# Patient Record
Sex: FEMALE | Race: WHITE | NOT HISPANIC OR LATINO | URBAN - METROPOLITAN AREA
[De-identification: names, ages, dates, MRNs, and addresses within clinical notes are randomized per-mention and may not be internally consistent; named-entity substitution may affect disease eponyms.]

---

## 2018-05-24 ENCOUNTER — APPOINTMENT (RX ONLY)
Dept: URBAN - METROPOLITAN AREA CLINIC 23 | Facility: CLINIC | Age: 48
Setting detail: DERMATOLOGY
End: 2018-05-24

## 2018-05-24 ENCOUNTER — RX ONLY (OUTPATIENT)
Age: 48
Setting detail: RX ONLY
End: 2018-05-24

## 2018-05-24 DIAGNOSIS — Z41.9 ENCOUNTER FOR PROCEDURE FOR PURPOSES OTHER THAN REMEDYING HEALTH STATE, UNSPECIFIED: ICD-10-CM

## 2018-05-24 PROCEDURE — ? PRESCRIPTION

## 2018-05-24 PROCEDURE — ? BOTOX

## 2018-05-24 PROCEDURE — ? ADDITIONAL NOTES

## 2018-05-24 RX ORDER — TRIAMCINOLONE ACETONIDE 1 MG/G
CREAM TOPICAL
Qty: 1 | Refills: 0 | Status: ERX

## 2018-05-24 RX ORDER — VALACYCLOVIR HYDROCHLORIDE 500 MG/1
TABLET, FILM COATED ORAL
Qty: 20 | Refills: 0 | Status: ERX | COMMUNITY
Start: 2018-05-24

## 2018-05-24 RX ORDER — CEPHALEXIN 500 MG/1
CAPSULE ORAL
Qty: 20 | Refills: 0 | Status: ERX

## 2018-05-24 RX ADMIN — VALACYCLOVIR HYDROCHLORIDE: 500 TABLET, FILM COATED ORAL at 00:00

## 2018-05-24 NOTE — PROCEDURE: BOTOX
Lateral Platysmal Bands Units: 0
Additional Area 1 Units: [address]
Detail Level: Zone
Consent: Written consent obtained. Risks include but not limited to lid/brow ptosis, bruising, swelling, diplopia, temporary effect, incomplete chemical denervation.
Additional Area 3 Location: lateral eyes
Additional Area 4 Units: 4
Additional Area 2 Location: glabella
Additional Area 3 Units: [address] [facility]
Additional Area 2 Units: 20
Dilution (U/0.1 Cc): 2.5
Lot #: [id number]
Expiration Date (Month Year): 12/2020
Additional Area 1 Location: high forehead 2.5 cm above brows
Additional Area 4 Location: lateral brows
Price (Use Numbers Only, No Special Characters Or $): 0.00
Additional Area 5 Location: chin

## 2018-06-11 ENCOUNTER — APPOINTMENT (RX ONLY)
Dept: URBAN - METROPOLITAN AREA CLINIC 23 | Facility: CLINIC | Age: 48
Setting detail: DERMATOLOGY
End: 2018-06-11

## 2018-06-11 DIAGNOSIS — Z41.9 ENCOUNTER FOR PROCEDURE FOR PURPOSES OTHER THAN REMEDYING HEALTH STATE, UNSPECIFIED: ICD-10-CM

## 2018-06-11 PROCEDURE — ? ADDITIONAL NOTES

## 2018-06-11 PROCEDURE — ? IN-HOUSE DISPENSING PHARMACY

## 2018-06-11 ASSESSMENT — LOCATION ZONE DERM: LOCATION ZONE: EYELID

## 2018-06-11 ASSESSMENT — LOCATION SIMPLE DESCRIPTION DERM
LOCATION SIMPLE: RIGHT SUPERIOR EYELID
LOCATION SIMPLE: RIGHT INFERIOR EYELID
LOCATION SIMPLE: LEFT SUPERIOR EYELID
LOCATION SIMPLE: LEFT INFERIOR EYELID

## 2018-06-11 ASSESSMENT — LOCATION DETAILED DESCRIPTION DERM
LOCATION DETAILED: LEFT LATERAL SUPERIOR EYELID
LOCATION DETAILED: RIGHT LATERAL SUPERIOR EYELID
LOCATION DETAILED: RIGHT LATERAL INFERIOR EYELID
LOCATION DETAILED: LEFT MEDIAL INFERIOR PRESEPTAL REGION

## 2018-06-11 NOTE — PROCEDURE: ADDITIONAL NOTES
Additional Notes: Written consent obtained, risks reviewed including but not limited to crusting, scabbing, blistering, scarring, darker\\nor lighter pigmentary change, incomplete improvement of dyschromia, wrinkles, prolonged erythema and facial\\nswelling, infection and bleeding. \\n\\nCO2RE Laser visit with the following settings:\\n\\nMode: \\nFractionated coverage:\\nRing:\\nRing Size:\\nRing mj:\\nRing Fluence:\\nCore mg: \\nCore Fluence:\\n\\nMode: \\nFractionated coverage:\\nRing:\\nRing Size:\\nRing mj:\\nRing Fluence:\\nCore mg: \\nCore Fluence:\\n\\nMode: \\nFractionated coverage:\\nRing:\\nRing Size:\\nRing mj:\\nRing Fluence:\\nCore mg: \\nCore Fluence:\\n\\n\\n\\nI reviewed with the patient in detail post-care instructions. Patient should avoid sun until area fully healed. Pt should apply\\nvaseline to treated areas, and remove crusts gently with water-vinegar soaks.

## 2018-06-11 NOTE — PROCEDURE: IN-HOUSE DISPENSING PHARMACY
Product 20 Refills: 0
Product 6 Application Directions: Take one pill in AM and PM x3 days
Product 40 Unit Type: mg
Product 1 Amount/Unit (Numbers Only): 1
Product 4 Unit Type: tube(s)
Product 5 Unit Type: tablets
Send Charges To Patient Encounter: No
Name Of Product 2: brightening pads 4%
Name Of Product 3: Glycolic cleanser 10 %
Product 2 Unit Type: jar(s)
Detail Level: Simple
Product 1 Price/Unit (In Dollars): 0.00
Product 1 Application Directions: Apply thin layer to affected area at bedtime
Name Of Product 4: Tretinoin 0.05%
Name Of Product 1: Hydroquinone 4% / Tretinoin 0.05% / Fluocinolone 0.01%
Product 2 Application Directions: apply to face daily am and pm as indicated
Product 3 Application Directions: Wash face, arms and back daily as instructed
Name Of Product 6: Valtrex
Product 4 Application Directions: Apply pea sized amount to entire face at bedtime
Name Of Product 5: Prednisone 20 mg
Product 6 Amount/Unit (Numbers Only): 6
Product 5 Application Directions: Take one tablet 20 mg prior to procedure for swelling as indicated

## 2018-06-12 ENCOUNTER — APPOINTMENT (RX ONLY)
Dept: URBAN - METROPOLITAN AREA CLINIC 23 | Facility: CLINIC | Age: 48
Setting detail: DERMATOLOGY
End: 2018-06-12

## 2018-06-12 DIAGNOSIS — Z41.9 ENCOUNTER FOR PROCEDURE FOR PURPOSES OTHER THAN REMEDYING HEALTH STATE, UNSPECIFIED: ICD-10-CM

## 2018-06-12 PROCEDURE — ? IN-HOUSE DISPENSING PHARMACY

## 2018-06-12 PROCEDURE — ? COSMETIC FOLLOW-UP

## 2018-06-12 NOTE — PROCEDURE: COSMETIC FOLLOW-UP
Detail Level: Zone
Treatment Override (Free Text): Periorbital core laser
Comments (Free Text): Post Fraxel/CO2 F/U: DAY 1: Soaked patient's face with water mixed with white distilled vinegar in a 1:1 ration for 5 minutes. Area pat cleaned followed by aquaphor application. Verbal post-tx instructions given to patient again. Continue Keflex and Valtrex as directed. Hat and strict sun avoidance. Recommended to continue soaks four times daily as ionstructed and aquaphor application on the site. Gentle cleansing reassuring rest of face.

## 2018-06-12 NOTE — PROCEDURE: IN-HOUSE DISPENSING PHARMACY
Product 29 Amount/Unit (Numbers Only): 0
Product 4 Application Directions: Apply pea sized amount to entire face at bedtime
Product 6 Unit Type: tablets
Product 74 Unit Type: mg
Product 3 Unit Type: tube(s)
Product 5 Amount/Unit (Numbers Only): 1
Product 2 Application Directions: apply to face daily am and pm as indicated
Detail Level: Zone
Product 1 Application Directions: Apply thin layer to affected area at bedtime
Render Refills If Set To 0: No
Product 1 Price/Unit (In Dollars): 0.00
Product 6 Application Directions: Take one pill in AM and PM x3 days
Product 5 Application Directions: Take one tablet 10 mg tomorrow am for swelling with food and full glas of water
Product 2 Unit Type: jar(s)
Name Of Product 6: Valtrex
Name Of Product 5: Prednisone 10 mg
Name Of Product 2: brightening pads 4%
Product 6 Amount/Unit (Numbers Only): 6
Name Of Product 1: Hydroquinone 4% / Tretinoin 0.05% / Fluocinolone 0.01%
Name Of Product 3: Glycolic cleanser 10 %
Product 3 Application Directions: Wash face, arms and back daily as instructed
Name Of Product 4: Tretinoin 0.05%

## 2018-06-14 ENCOUNTER — APPOINTMENT (RX ONLY)
Dept: URBAN - METROPOLITAN AREA CLINIC 23 | Facility: CLINIC | Age: 48
Setting detail: DERMATOLOGY
End: 2018-06-14

## 2018-06-14 DIAGNOSIS — L57.0 ACTINIC KERATOSIS: ICD-10-CM

## 2018-06-14 DIAGNOSIS — Z41.9 ENCOUNTER FOR PROCEDURE FOR PURPOSES OTHER THAN REMEDYING HEALTH STATE, UNSPECIFIED: ICD-10-CM

## 2018-06-14 DIAGNOSIS — D485 NEOPLASM OF UNCERTAIN BEHAVIOR OF SKIN: ICD-10-CM

## 2018-06-14 PROBLEM — D48.5 NEOPLASM OF UNCERTAIN BEHAVIOR OF SKIN: Status: ACTIVE | Noted: 2018-06-14

## 2018-06-14 PROCEDURE — ? LIQUID NITROGEN

## 2018-06-14 PROCEDURE — 99212 OFFICE O/P EST SF 10 MIN: CPT

## 2018-06-14 PROCEDURE — 17003 DESTRUCT PREMALG LES 2-14: CPT

## 2018-06-14 PROCEDURE — ? COUNSELING

## 2018-06-14 PROCEDURE — 11100: CPT | Mod: 59

## 2018-06-14 PROCEDURE — ? BIOPSY BY SHAVE METHOD

## 2018-06-14 PROCEDURE — 17000 DESTRUCT PREMALG LESION: CPT

## 2018-06-14 PROCEDURE — ? SUNSCREEN RECOMMENDATIONS

## 2018-06-14 PROCEDURE — ? ADDITIONAL NOTES

## 2018-06-14 ASSESSMENT — LOCATION SIMPLE DESCRIPTION DERM
LOCATION SIMPLE: RIGHT WRIST
LOCATION SIMPLE: LEFT CHEEK
LOCATION SIMPLE: RIGHT FOREHEAD

## 2018-06-14 ASSESSMENT — LOCATION DETAILED DESCRIPTION DERM
LOCATION DETAILED: LEFT INFERIOR CENTRAL MALAR CHEEK
LOCATION DETAILED: RIGHT DORSAL WRIST
LOCATION DETAILED: RIGHT FOREHEAD

## 2018-06-14 ASSESSMENT — LOCATION ZONE DERM
LOCATION ZONE: FACE
LOCATION ZONE: ARM

## 2018-06-14 NOTE — PROCEDURE: LIQUID NITROGEN
Consent: The patient's consent was obtained including but not limited to risks of crusting, scabbing, blistering, scarring, darker or lighter pigmentary change, recurrence, incomplete removal and infection.
Duration Of Freeze Thaw-Cycle (Seconds): 0
Post-Care Instructions: I reviewed with the patient in detail post-care instructions. Patient is to wear sunprotection, and avoid picking at any of the treated lesions. Pt may apply Vaseline to crusted or scabbing areas.
Number Of Freeze-Thaw Cycles: 1 freeze-thaw cycle
Detail Level: Zone
Render Post-Care Instructions In Note?: no

## 2018-06-14 NOTE — PROCEDURE: ADDITIONAL NOTES
Additional Notes: S/p 4 days Core laser follow up. Patient to continue soaks, decrease to twice a day-follow by  Aquaphor.  It fine for patient to start washing hair ,return on Monday

## 2018-06-14 NOTE — PROCEDURE: BIOPSY BY SHAVE METHOD
Silver Nitrate Text: The wound bed was treated with silver nitrate after the biopsy was performed.
Billing Type: [employer]
Hemostasis: Electrocautery
Dressing: bandage
Electrodesiccation And Curettage Text: The wound bed was treated with electrodesiccation and curettage after the biopsy was performed.
Was A Bandage Applied: Yes
Render Post-Care Instructions In Note?: no
Body Location Override (Optional - Billing Will Still Be Based On Selected Body Map Location If Applicable): right wrist
Consent: Written consent was obtained and risks were reviewed including but not limited to scarring, infection, bleeding, scabbing, incomplete removal, nerve damage and allergy to anesthesia.
Post-Care Instructions: I reviewed with the patient in detail post-care instructions. Patient is to keep the biopsy site dry overnight, and then apply bacitracin twice daily until healed. Patient may apply hydrogen peroxide soaks to remove any crusting.
Lab: [id number] [facility] [address]
Anesthesia Volume In Cc (Will Not Render If 0): 0.2
Biopsy Method: 15 blade
Biopsy Type: H and E
Type Of Destruction Used: Curettage
Size Of Lesion In Cm: 0
Wound Care: Vaseline
Anesthesia Type: 1% lidocaine with epinephrine
Curettage Text: The wound bed was treated with curettage after the biopsy was performed.
Electrodesiccation Text: The wound bed was treated with electrodesiccation after the biopsy was performed.
Cryotherapy Text: The wound bed was treated with cryotherapy after the biopsy was performed.
Notification Instructions: Patient will be notified of biopsy results. However, patient instructed to call the office if not contacted within 2 weeks.
Detail Level: Simple

## 2018-06-14 NOTE — PROCEDURE: SUNSCREEN RECOMMENDATIONS

## 2018-06-18 ENCOUNTER — APPOINTMENT (RX ONLY)
Dept: URBAN - METROPOLITAN AREA CLINIC 23 | Facility: CLINIC | Age: 48
Setting detail: DERMATOLOGY
End: 2018-06-18

## 2018-06-18 DIAGNOSIS — Z41.9 ENCOUNTER FOR PROCEDURE FOR PURPOSES OTHER THAN REMEDYING HEALTH STATE, UNSPECIFIED: ICD-10-CM

## 2018-06-18 PROCEDURE — ? RECOMMENDATIONS

## 2018-06-18 PROCEDURE — ? DYSPORT

## 2018-06-18 NOTE — PROCEDURE: RECOMMENDATIONS
Recommendation Preamble: The following product recommendations were made during the visit: recommended to start TNS recovery complex and Defenage system to the treated area. Instructions about skin care were discussed with patient in lenght.
Detail Level: Zone

## 2018-06-18 NOTE — PROCEDURE: DYSPORT
Price (Use Numbers Only, No Special Characters Or $): 0.00
Depressor Anguli Oris Units: 0
Dilution (U/ 0.1cc): 1.5
Additional Area 6 Location: glabella
Additional Area 2 Units: 6
Additional Area 4 Location: upper and lower lip cutaneous
Additional Area 1 Location: lateral eyes
Additional Area 3 Location: lower eyelids ( 2 units each side)
Detail Level: Simple
Consent: Written consent obtained. Risks include but not limited to lid/brow ptosis, bruising, swelling, diplopia, temporary effect, incomplete chemical denervation.
Additional Area 3 Units: 4
Lot #: [id number]
Additional Area 1 Units: 10
Expiration Date (Month Year): 12/31/2018
Additional Area 2 Location: bunny lines
Additional Area 5 Location: chin

## 2018-08-16 ENCOUNTER — APPOINTMENT (RX ONLY)
Dept: URBAN - METROPOLITAN AREA CLINIC 23 | Facility: CLINIC | Age: 48
Setting detail: DERMATOLOGY
End: 2018-08-16

## 2018-08-16 DIAGNOSIS — Z41.9 ENCOUNTER FOR PROCEDURE FOR PURPOSES OTHER THAN REMEDYING HEALTH STATE, UNSPECIFIED: ICD-10-CM

## 2018-08-16 PROCEDURE — ? BOTOX

## 2018-08-16 PROCEDURE — ? RECOMMENDATIONS

## 2018-08-16 NOTE — HPI: COSMETIC FOLLOW UP
How Did You Tolerate The Procedure?: well, without problems
What Condition Are We Treating?: Patient here s/p 2 months fractionated laser periorbital area. Good results here to discuss results. Currently on tretinoin cream QHS, TNS essential and Defenage system.
What Procedure Did We Perform At The Last Visit?: Core fractionated laser.

## 2018-08-16 NOTE — PROCEDURE: BOTOX
Glabellar Complex Units: 0
Expiration Date (Month Year): 03/2021
Consent: Written consent obtained. Risks include but not limited to lid/brow ptosis, bruising, swelling, diplopia, temporary effect, incomplete chemical denervation.
Lot #: [id number]
Detail Level: Zone
Additional Area 2 Units: 4
Additional Area 1 Units: [address] [facility]
Additional Area 3 Location: lateral eyes
Additional Area 1 Location: lateral eyelids
Additional Area 6 Location: glabella
Additional Area 2 Location: bunny lines
Additional Area 5 Location: lateral brows
Price (Use Numbers Only, No Special Characters Or $): 0.00
Dilution (U/0.1 Cc): 2.5
Additional Area 4 Location: neck bands

## 2018-10-03 ENCOUNTER — APPOINTMENT (RX ONLY)
Dept: URBAN - METROPOLITAN AREA CLINIC 23 | Facility: CLINIC | Age: 48
Setting detail: DERMATOLOGY
End: 2018-10-03

## 2018-10-03 ENCOUNTER — RX ONLY (OUTPATIENT)
Age: 48
Setting detail: RX ONLY
End: 2018-10-03

## 2018-10-03 DIAGNOSIS — Z41.9 ENCOUNTER FOR PROCEDURE FOR PURPOSES OTHER THAN REMEDYING HEALTH STATE, UNSPECIFIED: ICD-10-CM

## 2018-10-03 PROCEDURE — ? BOTOX

## 2018-10-03 RX ORDER — DAPSONE 50 MG/G
GEL TOPICAL
Qty: 1 | Refills: 2 | Status: ERX | COMMUNITY
Start: 2018-10-03

## 2018-10-03 NOTE — PROCEDURE: BOTOX
Additional Area 3 Location: Flip Lorenzo
Additional Area 4 Location: evangelista line
Consent: Written consent obtained. Risks include but not limited to lid/brow ptosis, bruising, swelling, diplopia, temporary effect, incomplete chemical denervation.
Nasal Root Units: 0
Detail Level: Zone
Additional Area 6 Location: Forehead
Expiration Date (Month Year): 04/2021
Additional Area 2 Units: 2
Additional Area 3 Units: 24
Additional Area 1 Location: high forehead
Additional Area 1 Units: 6
Glabellar Complex Units: [address] [address]
Price (Use Numbers Only, No Special Characters Or $): 0.00
Lot #: [id number]
Dilution (U/0.1 Cc): 2.5
Additional Area 2 Location: bunny lines

## 2018-12-14 RX ORDER — CEPHALEXIN 500 MG/1
CAPSULE ORAL
Qty: 20 | Refills: 0 | Status: ERX

## 2018-12-17 ENCOUNTER — APPOINTMENT (RX ONLY)
Dept: URBAN - METROPOLITAN AREA CLINIC 23 | Facility: CLINIC | Age: 48
Setting detail: DERMATOLOGY
End: 2018-12-17

## 2018-12-17 DIAGNOSIS — Z41.9 ENCOUNTER FOR PROCEDURE FOR PURPOSES OTHER THAN REMEDYING HEALTH STATE, UNSPECIFIED: ICD-10-CM

## 2018-12-17 PROCEDURE — ? ADDITIONAL NOTES

## 2018-12-17 NOTE — PROCEDURE: ADDITIONAL NOTES
Additional Notes: Written consent obtained, risks reviewed including but not limited to crusting, scabbing, blistering, scarring, darker\\nor lighter pigmentary change, incomplete improvement of dyschromia, wrinkles, prolonged erythema and facial\\nswelling, infection and bleeding. \\n\\nPhotos taken: Yes\\nAnesthesia: topical Lidocaine 4 % cream  & local infiltration ( Plain Lidocaine 1% and lidocaine with epinephrine in a ratio of .5/. 5. \\nAmount of anesthesia: Total= 1 cc \\nType of eye protection: External.  \\n\\nCO2RE Laser with the following settings:\\n\\nSite: Lower eyelids bilateral and lateral eyelids \\nMode: Fusion  \\nFractionated coverage: 25 % \\nRing Size:\\nRing mj: 89.4 mj \\nRing Fluence: 17.2 j/cm2\\nCore m mj\\nCore Fluence:277 j/cm2 \\n\\n\\nI reviewed with the patient in detail post-care instructions. Patient should avoid sun until area fully healed. Pt should apply vaseline to treated areas, and remove crusts gently with water-vinegar soaks.

## 2018-12-18 ENCOUNTER — APPOINTMENT (RX ONLY)
Dept: URBAN - METROPOLITAN AREA CLINIC 23 | Facility: CLINIC | Age: 48
Setting detail: DERMATOLOGY
End: 2018-12-18

## 2018-12-18 DIAGNOSIS — Z48.817 ENCOUNTER FOR SURGICAL AFTERCARE FOLLOWING SURGERY ON THE SKIN AND SUBCUTANEOUS TISSUE: ICD-10-CM

## 2018-12-18 PROCEDURE — ? COUNSELING

## 2018-12-18 PROCEDURE — 99212 OFFICE O/P EST SF 10 MIN: CPT

## 2018-12-18 NOTE — PROCEDURE: COUNSELING
Patient Specific Counseling (Will Not Stick From Patient To Patient): Instructed to continue with vinegar soaks 3-4 times daily and aquaphor healing ointment daily as indicated. Instructed to call office in 72 hours if any problems or questions. Instructed to return to office in 3-4 days if needed.
Detail Level: Simple

## 2019-01-21 ENCOUNTER — APPOINTMENT (RX ONLY)
Dept: URBAN - METROPOLITAN AREA CLINIC 23 | Facility: CLINIC | Age: 49
Setting detail: DERMATOLOGY
End: 2019-01-21

## 2019-01-21 DIAGNOSIS — B07.8 OTHER VIRAL WARTS: ICD-10-CM

## 2019-01-21 DIAGNOSIS — Z41.9 ENCOUNTER FOR PROCEDURE FOR PURPOSES OTHER THAN REMEDYING HEALTH STATE, UNSPECIFIED: ICD-10-CM

## 2019-01-21 PROCEDURE — 17110 DESTRUCTION B9 LES UP TO 14: CPT

## 2019-01-21 PROCEDURE — ? DIAGNOSIS COMMENT

## 2019-01-21 PROCEDURE — ? BOTOX

## 2019-01-21 PROCEDURE — ? BENIGN DESTRUCTION

## 2019-01-21 ASSESSMENT — LOCATION DETAILED DESCRIPTION DERM: LOCATION DETAILED: RIGHT DISTAL DORSAL FOREARM

## 2019-01-21 ASSESSMENT — LOCATION ZONE DERM: LOCATION ZONE: ARM

## 2019-01-21 ASSESSMENT — LOCATION SIMPLE DESCRIPTION DERM: LOCATION SIMPLE: RIGHT FOREARM

## 2019-01-21 NOTE — PROCEDURE: BOTOX
Depressor Anguli Oris Units: 0
Forehead Units: 12
Additional Area 4 Location: Neck bands
Lot #: [id number]
Expiration Date (Month Year): 07/2021
Additional Area 2 Units: [address] [facility]
Additional Area 1 Location: high forehead 2cm above brows
Additional Area 5 Location: high forehead 2.5cm above brows
Additional Area 6 Location: 2 cm above the brow
Additional Area 2 Location: Crows feet
Price (Use Numbers Only, No Special Characters Or $): 0.00
Consent: Written consent obtained. Risks include but not limited to lid/brow ptosis, bruising, swelling, diplopia, temporary effect, incomplete chemical denervation.
Glabellar Complex Units: [address] [facility]
Additional Area 3 Location: glabella
Detail Level: Zone
Dilution (U/0.1 Cc): 2.5

## 2019-01-21 NOTE — PROCEDURE: BENIGN DESTRUCTION
Medical Necessity Information: It is in your best interest to select a reason for this procedure from the list below. All of these items fulfill various CMS LCD requirements except the new and changing color options.
Render Post-Care Instructions In Note?: yes
Post-Care Instructions: I reviewed with the patient in detail post-care instructions. Patient is to wear sunprotection, and avoid picking at any of the treated lesions. Pt may apply Vaseline to crusted or scabbing areas.
Add 52 Modifier (Optional): no
Medical Necessity Clause: This procedure was medically necessary because the lesions that were treated were:
Treatment Number (Will Not Render If 0): 0
Detail Level: Simple
Consent: The patient's consent was obtained including but not limited to risks of crusting, scabbing, blistering, scarring, darker or lighter pigmentary change, recurrence, incomplete removal and infection.
Anesthesia Type: 1% lidocaine with 1:100,000 epinephrine
Anesthesia Volume In Cc: 0.2

## 2019-03-27 ENCOUNTER — RX ONLY (OUTPATIENT)
Age: 49
Setting detail: RX ONLY
End: 2019-03-27

## 2019-03-27 RX ORDER — DAPSONE 75 MG/G
GEL TOPICAL QD
Qty: 1 | Refills: 2 | Status: ERX | COMMUNITY
Start: 2019-03-27

## 2019-05-22 ENCOUNTER — APPOINTMENT (RX ONLY)
Dept: URBAN - METROPOLITAN AREA CLINIC 23 | Facility: CLINIC | Age: 49
Setting detail: DERMATOLOGY
End: 2019-05-22

## 2019-05-22 DIAGNOSIS — Z41.9 ENCOUNTER FOR PROCEDURE FOR PURPOSES OTHER THAN REMEDYING HEALTH STATE, UNSPECIFIED: ICD-10-CM

## 2019-05-22 PROCEDURE — ? BOTOX

## 2019-05-22 NOTE — PROCEDURE: BOTOX
Depressor Anguli Oris Units: 0
Topical Anesthesia?: 20% benzocaine, 8% lidocaine, 4% tetracaine
Additional Area 1 Location: glabella
Additional Area 3 Units: 8
Lot #: [id number]
Length Of Topical Anesthesia Application (Optional): 15 minutes
Additional Area 2 Location: lateral eyes
Price (Use Numbers Only, No Special Characters Or $): 0.00
Additional Area 4 Units: 6
Additional Area 4 Location: bunny lines
Dilution (U/0.1 Cc): 2.5
Additional Area 3 Location: high forehead 3 cm above brows
Detail Level: Zone
Additional Area 2 Units: 24
Consent: Written consent obtained. Risks include but not limited to lid/brow ptosis, bruising, swelling, diplopia, temporary effect, incomplete chemical denervation.
Additional Area 6 Location: [facility].
Expiration Date (Month Year): 10/2021
Additional Area 5 Location: lateral brows

## 2019-06-18 ENCOUNTER — APPOINTMENT (RX ONLY)
Dept: URBAN - METROPOLITAN AREA CLINIC 23 | Facility: CLINIC | Age: 49
Setting detail: DERMATOLOGY
End: 2019-06-18

## 2019-06-18 DIAGNOSIS — L28.0 LICHEN SIMPLEX CHRONICUS: ICD-10-CM

## 2019-06-18 DIAGNOSIS — L81.4 OTHER MELANIN HYPERPIGMENTATION: ICD-10-CM

## 2019-06-18 DIAGNOSIS — Z87.2 PERSONAL HISTORY OF DISEASES OF THE SKIN AND SUBCUTANEOUS TISSUE: ICD-10-CM

## 2019-06-18 PROCEDURE — 99213 OFFICE O/P EST LOW 20 MIN: CPT | Mod: 25

## 2019-06-18 PROCEDURE — ? SUNSCREEN RECOMMENDATIONS

## 2019-06-18 PROCEDURE — 11900 INJECT SKIN LESIONS </W 7: CPT

## 2019-06-18 PROCEDURE — ? COUNSELING

## 2019-06-18 PROCEDURE — ? INTRALESIONAL KENALOG

## 2019-06-18 ASSESSMENT — LOCATION SIMPLE DESCRIPTION DERM: LOCATION SIMPLE: LEFT FOREARM

## 2019-06-18 ASSESSMENT — LOCATION ZONE DERM: LOCATION ZONE: ARM

## 2019-06-18 ASSESSMENT — LOCATION DETAILED DESCRIPTION DERM: LOCATION DETAILED: LEFT VENTRAL PROXIMAL FOREARM

## 2019-06-18 NOTE — PROCEDURE: INTRALESIONAL KENALOG
Medical Necessity Clause: This procedure was medically necessary because the lesions that were treated were:
X Size Of Lesion In Cm (Optional): 0
Ndc# For Kenalog Only: [id number]
Lot # (Optional): [id number]
Consent: The risks of atrophy were reviewed with the patient.
Expiration Date (Optional): 10/2020
Total Volume Injected (Ccs- Only Use Numbers And Decimals): 0.2
Concentration Of Solution Injected (Mg/Ml): 2.5
Detail Level: Detailed
Administered By (Optional): Dr. Rehan Boateng
Kenalog Preparation: Kenalog
Include Z78.9 (Other Specified Conditions Influencing Health Status) As An Associated Diagnosis?: No

## 2019-08-14 ENCOUNTER — APPOINTMENT (RX ONLY)
Dept: URBAN - METROPOLITAN AREA CLINIC 23 | Facility: CLINIC | Age: 49
Setting detail: DERMATOLOGY
End: 2019-08-14

## 2019-08-14 DIAGNOSIS — Z41.9 ENCOUNTER FOR PROCEDURE FOR PURPOSES OTHER THAN REMEDYING HEALTH STATE, UNSPECIFIED: ICD-10-CM

## 2019-08-14 PROCEDURE — ? JEUVEAU (U OR CC)

## 2019-08-14 NOTE — PROCEDURE: JEUVEAU (U OR CC)
Additional Area 1 Location: lateral eyes
Document As Units Or Cc?: units
Depressor Anguli Oris Units: 0
Lot #: [id number]
Additional Area 3 Units: 12
Including Pricing Information In The Note: No
Additional Area 4 Units: 8
Additional Area 5 Units: 4
Additional Area 2 Location: bunny lines
Additional Area 5 Location: chin
Additional Area 4 Location: glabella
Post-Care Instructions: Patient instructed to not lie down for 4 hours and limit physical activity for 24 hours.
Additional Area 3 Location: high forehead 3 cm above brows
Detail Level: Detailed
Dilution (U/0.1 Cc): 2.5
Additional Area 2 Units: 6
Length Of Topical Anesthesia Application (Optional): 15 minutes
Consent: Written consent obtained. Risks include but not limited to lid/brow ptosis, bruising, swelling, diplopia, temporary effect, incomplete chemical denervation.
Expiration Date (Month Year): 02/22
Topical Anesthesia?: 20% benzocaine, 8% lidocaine, and 8% tetracaine
Additional Area 1 Units: [address] [address]

## 2019-11-21 ENCOUNTER — APPOINTMENT (RX ONLY)
Dept: URBAN - METROPOLITAN AREA CLINIC 23 | Facility: CLINIC | Age: 49
Setting detail: DERMATOLOGY
End: 2019-11-21

## 2019-11-21 DIAGNOSIS — Z41.9 ENCOUNTER FOR PROCEDURE FOR PURPOSES OTHER THAN REMEDYING HEALTH STATE, UNSPECIFIED: ICD-10-CM

## 2019-11-21 DIAGNOSIS — D485 NEOPLASM OF UNCERTAIN BEHAVIOR OF SKIN: ICD-10-CM

## 2019-11-21 PROBLEM — D48.5 NEOPLASM OF UNCERTAIN BEHAVIOR OF SKIN: Status: ACTIVE | Noted: 2019-11-21

## 2019-11-21 PROCEDURE — ? BOTOX

## 2019-11-21 PROCEDURE — 11305 SHAVE SKIN LESION 0.5 CM/<: CPT

## 2019-11-21 PROCEDURE — ? SHAVE REMOVAL

## 2019-11-21 ASSESSMENT — LOCATION ZONE DERM: LOCATION ZONE: HAND

## 2019-11-21 ASSESSMENT — LOCATION DETAILED DESCRIPTION DERM: LOCATION DETAILED: RIGHT RADIAL DORSAL HAND

## 2019-11-21 ASSESSMENT — LOCATION SIMPLE DESCRIPTION DERM: LOCATION SIMPLE: RIGHT HAND

## 2019-11-21 NOTE — PROCEDURE: SHAVE REMOVAL
X Size Of Lesion In Cm (Optional): 0
Billing Type: [employer]
Render Path Notes In Note?: No
Was A Bandage Applied: Yes
Lab: [address] [facility]
Wound Care: Mupirocin
Anesthesia Volume In Cc: 0.5
Anesthesia Type: 1% lidocaine with epinephrine
Biopsy Method: 15 blade
Post-Care Instructions: I reviewed with the patient in detail post-care instructions. Patient is to keep the biopsy site dry overnight, and then apply bacitracin twice daily until healed. Patient may apply hydrogen peroxide soaks to remove any crusting.
Medical Necessity Clause: The lesion was removed in its entirety and was medically necessary because the lesion that was treated was:
Medical Necessity Information: It is in your best interest to select a reason for this procedure from the list below. All of these items fulfill various CMS LCD requirements except the new and changing color options.
Size Of Lesion In Cm (Required): 0.3
Detail Level: Detailed
Notification Instructions: Patient will be notified of biopsy results. However, patient instructed to call the office if not contacted within 2 weeks.
Hemostasis: Electrocautery
Consent: The provider's intent is to therapeutically remove the lesion in its entirety while at the same time obtaining a tissue sample for histopathologic examination. The risks and benefits to therapy were discussed in detail. Specifically, the risks of infection, scarring, bleeding, prolonged wound healing, incomplete removal, allergy to anesthesia, nerve injury and recurrence were addressed. Prior to the procedure, the treatment site was clearly identified and confirmed by the patient. All components of Universal Protocol/PAUSE Rule completed.

## 2019-11-21 NOTE — PROCEDURE: BOTOX
Show Glabellar Units: Yes
Forehead Units: 12
[facility] Units: 0
Additional Area 6 Location: [facility].
Expiration Date (Month Year): 10/2021
Glabellar Complex Units: [address] [facility]
Price (Use Numbers Only, No Special Characters Or $): 0.00
Show Ucl Units: No
Lot #: [id number]
Additional Area 2 Location: crows feet
Additional Area 2 Units: 24
Consent: Written consent obtained. Risks include but not limited to lid/brow ptosis, bruising, swelling, diplopia, temporary effect, incomplete chemical denervation.
Additional Area 5 Location: high forehead 1.5cm above brows
Detail Level: Zone
Dilution (U/0.1 Cc): 2.5
Additional Area 3 Location: high forehead 2 cm above brows
Additional Area 1 Location: lat eyes touch up

## 2020-07-20 ENCOUNTER — APPOINTMENT (RX ONLY)
Dept: URBAN - METROPOLITAN AREA CLINIC 23 | Facility: CLINIC | Age: 50
Setting detail: DERMATOLOGY
End: 2020-07-20

## 2020-07-20 DIAGNOSIS — Z41.9 ENCOUNTER FOR PROCEDURE FOR PURPOSES OTHER THAN REMEDYING HEALTH STATE, UNSPECIFIED: ICD-10-CM

## 2020-07-20 PROCEDURE — ? IN-HOUSE DISPENSING PHARMACY

## 2020-07-20 PROCEDURE — ? BOTOX

## 2020-07-20 NOTE — PROCEDURE: BOTOX
Right Pupillary Line Units: 0
Show Topical Anesthesia: Yes
Additional Area 1 Units: 4
Additional Area 6 Location: [facility].
Show Lcl Units: No
Glabellar Complex Units: [address] [facility]
Additional Area 2 Location: chin
Expiration Date (Month Year): 03/2023
Detail Level: Zone
Periorbital Skin Units: 12
Lot #: [id number]
Price (Use Numbers Only, No Special Characters Or $): 0.00
Dilution (U/0.1 Cc): 2.5
Additional Area 1 Location: lat brows
Additional Area 3 Location: high forehead 2 cm above brows
Consent: Written consent obtained. Risks include but not limited to lid/brow ptosis, bruising, swelling, diplopia, temporary effect, incomplete chemical denervation.
Additional Area 5 Location: high forehead 1.5cm above brows

## 2020-07-20 NOTE — PROCEDURE: IN-HOUSE DISPENSING PHARMACY
Product 10 Unit Type: bottle(s)
Product 37 Refills: 0
Name Of Product 4: Valium 5 mg
Product 8 Application Directions: Apply to the clean face in the AM and PM daily
Name Of Product 19: Diazepam 5mg
Product 43 Unit Type: mg
Name Of Product 21: Valium 5mg
Product 10 Application Directions: Take 1 tablet 2 times daily for 3 days
Product 3 Unit Type: grams
Product 12 Application Directions: Apply a pea size amount to the entire face at bedtime.
Product 1 Application Directions: Apply thin layer to right cheek every night  at bedtime only.
Product 14 Amount/Unit (Numbers Only): 1
Name Of Product 8: TNS Essential
Product 3 Application Directions: Apply a pea size amount to the entire face at night
Product 7 Unit Type: ml
Product 22 Application Directions: Apply to the affected area of the face
Name Of Product 12: Retinol Lite
Name Of Product 1: Hydroquinone 4% / Tretinoin 0.05% / Fluocinolone 0.01%
Name Of Product 3: Tretinoin 0.05% cream
Product 7 Application Directions: Apply to lashes at bedtime daily as indicated
Product 9 Unit Type: jar(s)
Name Of Product 14: Skin Better Interfuse eye treatment cream
Name Of Product 5: loratadine 10 mg
Product 9 Application Directions: Apply to the acne prone areas in the Am and Pm
Product 2 Amount/Unit (Numbers Only): 6
Render Product Pricing In Note: No
Product 4 Unit Type: tablets
Product 13 Application Directions: Apply to the entire face in the Am and pm
Product 4 Application Directions: Take one tablet half hour today prior to procedure as indicated.  Dispense in office
Name Of Product 9: Clearing tone pads
Product 2 Price/Unit (In Dollars): 0.00
Name Of Product 11: Clearing gel
Product 21 Application Directions: Take one tablet 30 minutes prior to procedure
Detail Level: Zone
Name Of Product 13: Skin Better Even tone correcting serum
Product 1 Unit Type: tube(s)
Name Of Product 6: Prednisone 20mg
Product 3 Amount/Unit (Numbers Only): [address] [facility]
Product 14 Application Directions: Apply to the under eyes in the Am and pm
Product 5 Application Directions: Take one tablet today after procedure with full glass of water as indicated
Name Of Product 10: Valtrex 500 mg
Product 7 Amount/Unit (Numbers Only): 5
Product 11 Application Directions: Apply a thin layer to the acne prone areas in the AM
Name Of Product 22: Brightening Pads
Product 2 Application Directions: apply to affected areas left lower leg am and pm  as indicated
Name Of Product 7: Latisse 5ml
Name Of Product 2: Valtrex
Product 6 Application Directions: Take one tablet for swelling today at the office as indicated and one tablet tomorrow

## 2020-07-29 ENCOUNTER — APPOINTMENT (RX ONLY)
Dept: URBAN - METROPOLITAN AREA CLINIC 23 | Facility: CLINIC | Age: 50
Setting detail: DERMATOLOGY
End: 2020-07-29

## 2020-07-29 DIAGNOSIS — Z41.9 ENCOUNTER FOR PROCEDURE FOR PURPOSES OTHER THAN REMEDYING HEALTH STATE, UNSPECIFIED: ICD-10-CM

## 2020-07-29 PROCEDURE — ? BOTOX

## 2020-07-29 PROCEDURE — ? RECOMMENDATIONS

## 2020-07-29 NOTE — PROCEDURE: BOTOX
Glabellar Complex Units: 0
Show Lcl Units: No
Additional Area 1 Location: lat brows
Show Nasal Units: Yes
Dilution (U/0.1 Cc): 2.5
Additional Area 3 Location: high forehead 2 cm above brows
Additional Area 5 Location: high forehead 1.5cm above brows
Lot #: [id number]
Price (Use Numbers Only, No Special Characters Or $): 0.00
Additional Area 4 Units: 4
Consent: Written consent obtained. Risks include but not limited to lid/brow ptosis, bruising, swelling, diplopia, temporary effect, incomplete chemical denervation.
Additional Area 2 Location: chin
Detail Level: Zone
Additional Area 4 Location: lateral eyes
Expiration Date (Month Year): 4/23
Additional Area 6 Location: [facility].
Additional Area 3 Units: [address] [facility]

## 2020-10-27 ENCOUNTER — APPOINTMENT (RX ONLY)
Dept: URBAN - METROPOLITAN AREA CLINIC 23 | Facility: CLINIC | Age: 50
Setting detail: DERMATOLOGY
End: 2020-10-27

## 2020-10-27 VITALS — TEMPERATURE: 97.3 F

## 2020-10-27 DIAGNOSIS — Z41.9 ENCOUNTER FOR PROCEDURE FOR PURPOSES OTHER THAN REMEDYING HEALTH STATE, UNSPECIFIED: ICD-10-CM

## 2020-10-27 PROCEDURE — ? BOTOX

## 2020-10-27 NOTE — PROCEDURE: BOTOX
Show Additional Area 3: Yes
Dilution (U/0.1 Cc): 2.5
Additional Area 3 Location: high forehead
Levator Labii Superioris Units: 0
Additional Area 1 Location: lat brows
Show Lcl Units: No
Additional Area 3 Units: 12
Additional Area 6 Location: [facility].
Glabellar Complex Units: 24
Expiration Date (Month Year): 06/23
Additional Area 2 Location: chin
Price (Use Numbers Only, No Special Characters Or $): 0.00
Detail Level: Zone
Lot #: [id number]
Consent: Written consent obtained. Risks include but not limited to lid/brow ptosis, bruising, swelling, diplopia, temporary effect, incomplete chemical denervation.
Additional Area 5 Location: high forehead 1.5cm above brows

## 2020-12-30 ENCOUNTER — APPOINTMENT (RX ONLY)
Dept: URBAN - METROPOLITAN AREA CLINIC 23 | Facility: CLINIC | Age: 50
Setting detail: DERMATOLOGY
End: 2020-12-30

## 2020-12-30 VITALS — TEMPERATURE: 97.3 F

## 2020-12-30 DIAGNOSIS — L81.4 OTHER MELANIN HYPERPIGMENTATION: ICD-10-CM

## 2020-12-30 DIAGNOSIS — D18.0 HEMANGIOMA: ICD-10-CM

## 2020-12-30 DIAGNOSIS — L82.1 OTHER SEBORRHEIC KERATOSIS: ICD-10-CM

## 2020-12-30 PROBLEM — D18.01 HEMANGIOMA OF SKIN AND SUBCUTANEOUS TISSUE: Status: ACTIVE | Noted: 2020-12-30

## 2020-12-30 PROCEDURE — ? COUNSELING

## 2020-12-30 PROCEDURE — 99214 OFFICE O/P EST MOD 30 MIN: CPT

## 2020-12-30 ASSESSMENT — LOCATION DETAILED DESCRIPTION DERM
LOCATION DETAILED: SUBXIPHOID
LOCATION DETAILED: SUPERIOR THORACIC SPINE

## 2020-12-30 ASSESSMENT — LOCATION SIMPLE DESCRIPTION DERM
LOCATION SIMPLE: UPPER BACK
LOCATION SIMPLE: ABDOMEN

## 2020-12-30 ASSESSMENT — LOCATION ZONE DERM: LOCATION ZONE: TRUNK

## 2021-02-15 ENCOUNTER — APPOINTMENT (RX ONLY)
Dept: URBAN - METROPOLITAN AREA CLINIC 23 | Facility: CLINIC | Age: 51
Setting detail: DERMATOLOGY
End: 2021-02-15

## 2021-02-15 VITALS — TEMPERATURE: 97.3 F

## 2021-02-15 DIAGNOSIS — Z41.9 ENCOUNTER FOR PROCEDURE FOR PURPOSES OTHER THAN REMEDYING HEALTH STATE, UNSPECIFIED: ICD-10-CM

## 2021-02-15 PROCEDURE — ? DYSPORT

## 2021-02-15 NOTE — PROCEDURE: DYSPORT
Topical Anesthesia?: 20% benzocaine, 8% lidocaine, 4% tetracaine
Show Forehead Units: Yes
Additional Area 3 Location: lateral eyes
Left Periorbital Units: 0
Consent: Written consent obtained. Risks include but not limited to lid/brow ptosis, bruising, swelling, diplopia, temporary effect, incomplete chemical denervation.
Show Ucl Units: No
Additional Area 3 Units: [address] [facility]
Additional Area 1 Location: high forehead 3 cm above brows
Additional Area 1 Units: 10
Additional Area 4 Location: 2cm high forehead
Length Of Topical Anesthesia Application (Optional): 15 minutes
Expiration Date (Month Year): 09/30/21
Additional Area 2 Location: glabella
Detail Level: Simple
Price (Use Numbers Only, No Special Characters Or $): 0.00
Lot #: [id number]
Additional Area 2 Units: 48
Dilution (U/ 0.1cc): 1.5

## 2021-06-22 ENCOUNTER — APPOINTMENT (RX ONLY)
Dept: URBAN - METROPOLITAN AREA CLINIC 23 | Facility: CLINIC | Age: 51
Setting detail: DERMATOLOGY
End: 2021-06-22

## 2021-06-22 VITALS — TEMPERATURE: 97.3 F

## 2021-06-22 DIAGNOSIS — Z41.9 ENCOUNTER FOR PROCEDURE FOR PURPOSES OTHER THAN REMEDYING HEALTH STATE, UNSPECIFIED: ICD-10-CM

## 2021-06-22 PROCEDURE — ? IN-HOUSE DISPENSING PHARMACY

## 2021-06-22 PROCEDURE — ? DYSPORT

## 2021-06-22 NOTE — PROCEDURE: IN-HOUSE DISPENSING PHARMACY
Product 47 Refills: 0
Product 38 Unit Type: mg
Name Of Product 7: Latisse 5ml
Product 4 Amount/Unit (Numbers Only): 1
Name Of Product 13: Skin Better Even tone correcting serum
Product 2 Application Directions: apply to affected areas left lower leg am and pm  as indicated
Product 11 Application Directions: Apply a thin layer to the acne prone areas in the AM
Product 2 Unit Type: tablets
Product 22 Application Directions: Apply to the affected area of the face
Name Of Product 3: Tretinoin 0.05% cream
Product 7 Application Directions: Apply to lashes at bedtime daily as indicated
Product 13 Application Directions: Apply to the entire face in the Am and pm
Product 7 Unit Type: ml
Product 13 Unit Type: bottle(s)
Product 1 Price/Unit (In Dollars): 0.00
Name Of Product 8: TNS Essential
Name Of Product 21: Valium 5mg
Product 10 Application Directions: Take 1 tablet 2 times daily for 3 days
Name Of Product 6: Prednisone 20mg
Product 3 Amount/Unit (Numbers Only): [address] [facility] [place]
Name Of Product 12: Retinol Lite
Product 1 Application Directions: Apply thin layer to right cheek every night  at bedtime only.
Product 1 Unit Type: tube(s)
Product 21 Application Directions: Take one tablet 30 minutes prior to procedure
Name Of Product 2: Valtrex
Product 6 Application Directions: Take one tablet for swelling today at the office as indicated and one tablet tomorrow
Product 12 Application Directions: Apply a pea size amount to the entire face at bedtime.
Name Of Product 11: Clearing gel
Name Of Product 22: Brightening Pads
Send Charges To Patient Encounter: No
Product 9 Application Directions: Apply to the acne prone areas in the Am and Pm
Name Of Product 5: loratadine 10 mg
Product 2 Amount/Unit (Numbers Only): 6
Product 9 Unit Type: jar(s)
Name Of Product 10: Valtrex 500 mg
Product 7 Amount/Unit (Numbers Only): 5
Name Of Product 1: Hydroquinone 4% / Tretinoin 0.05% / Fluocinolone 0.01%
Product 5 Application Directions: Take one tablet today after procedure with full glass of water as indicated
Product 3 Application Directions: Apply a pea size amount to the entire face at night
Name Of Product 14: Skin Better Interfuse eye treatment cream
Name Of Product 19: Diazepam 5mg
Product 3 Unit Type: grams
Name Of Product 4: Valium 5 mg
Product 8 Application Directions: Apply to the clean face in the AM and PM daily
Product 14 Application Directions: Apply to the under eyes in the Am and pm
Name Of Product 9: Clearing tone pads
Detail Level: Zone
Product 4 Application Directions: Take one tablet half hour today prior to procedure as indicated.  Dispense in office

## 2021-06-22 NOTE — PROCEDURE: DYSPORT
Topical Anesthesia?: 20% benzocaine, 8% lidocaine, 4% tetracaine
Show Forehead Units: Yes
Additional Area 3 Location: lateral eyes
Left Periorbital Units: 0
Consent: Written consent obtained. Risks include but not limited to lid/brow ptosis, bruising, swelling, diplopia, temporary effect, incomplete chemical denervation.
Show Ucl Units: No
Additional Area 3 Units: [address] [facility]
Additional Area 1 Location: high forehead 3 cm above brows
Additional Area 1 Units: 10
Additional Area 4 Location: 2cm high forehead
Length Of Topical Anesthesia Application (Optional): 15 minutes
Expiration Date (Month Year): 09/30/21
Additional Area 2 Location: glabella
Detail Level: Simple
Price (Use Numbers Only, No Special Characters Or $): 0.00
Additional Area 2 Units: [address]
Dilution (U/ 0.1cc): 1.5

## 2021-09-15 ENCOUNTER — APPOINTMENT (RX ONLY)
Dept: URBAN - METROPOLITAN AREA CLINIC 23 | Facility: CLINIC | Age: 51
Setting detail: DERMATOLOGY
End: 2021-09-15

## 2021-09-15 DIAGNOSIS — Z41.9 ENCOUNTER FOR PROCEDURE FOR PURPOSES OTHER THAN REMEDYING HEALTH STATE, UNSPECIFIED: ICD-10-CM

## 2021-09-15 PROCEDURE — ? DYSPORT

## 2021-12-21 ENCOUNTER — APPOINTMENT (RX ONLY)
Dept: URBAN - METROPOLITAN AREA CLINIC 23 | Facility: CLINIC | Age: 51
Setting detail: DERMATOLOGY
End: 2021-12-21

## 2021-12-21 DIAGNOSIS — Z41.9 ENCOUNTER FOR PROCEDURE FOR PURPOSES OTHER THAN REMEDYING HEALTH STATE, UNSPECIFIED: ICD-10-CM

## 2021-12-21 PROCEDURE — ? DYSPORT

## 2022-01-19 ENCOUNTER — RX ONLY (OUTPATIENT)
Age: 52
Setting detail: RX ONLY
End: 2022-01-19

## 2022-01-19 RX ORDER — TRETIONIN 0.5 MG/G
CREAM TOPICAL
Qty: 20 | Refills: 0 | Status: ERX | COMMUNITY
Start: 2022-01-19

## 2022-04-04 ENCOUNTER — APPOINTMENT (RX ONLY)
Dept: URBAN - METROPOLITAN AREA CLINIC 23 | Facility: CLINIC | Age: 52
Setting detail: DERMATOLOGY
End: 2022-04-04

## 2022-04-04 DIAGNOSIS — Z41.9 ENCOUNTER FOR PROCEDURE FOR PURPOSES OTHER THAN REMEDYING HEALTH STATE, UNSPECIFIED: ICD-10-CM

## 2022-04-04 PROCEDURE — ? DYSPORT

## 2022-04-04 NOTE — PROCEDURE: DYSPORT
Lcl Root Units: 0
Show Depressor Anguli Units: Yes
Additional Area 3 Units: [address]
Show Right And Left Brow Units: No
Additional Area 1 Units: [address] [facility]
Dilution (U/ 0.1cc): 1.5
Additional Area 2 Location: bunny lines
Topical Anesthesia?: 20% benzocaine, 8% lidocaine, 4% tetracaine
Additional Area 4 Location: lateral eyes
Consent: Written consent obtained. Risks include but not limited to lid/brow ptosis, bruising, swelling, diplopia, temporary effect, incomplete chemical denervation.
Additional Area 4 Units: [address] [facility]
Expiration Date (Month Year): 12/31/2022
Additional Area 2 Units: 10
Length Of Topical Anesthesia Application (Optional): 15 minutes
Lot #: [id number]
Additional Area 5 Location: glabella
Detail Level: Simple
Price (Use Numbers Only, No Special Characters Or $): 0.00
Additional Area 1 Location: high forehead 2 cm above brows

## 2022-08-08 ENCOUNTER — APPOINTMENT (RX ONLY)
Dept: URBAN - METROPOLITAN AREA CLINIC 23 | Facility: CLINIC | Age: 52
Setting detail: DERMATOLOGY
End: 2022-08-08

## 2022-08-08 DIAGNOSIS — D18.0 HEMANGIOMA: ICD-10-CM

## 2022-08-08 DIAGNOSIS — L81.4 OTHER MELANIN HYPERPIGMENTATION: ICD-10-CM

## 2022-08-08 DIAGNOSIS — Z41.9 ENCOUNTER FOR PROCEDURE FOR PURPOSES OTHER THAN REMEDYING HEALTH STATE, UNSPECIFIED: ICD-10-CM

## 2022-08-08 PROBLEM — D18.01 HEMANGIOMA OF SKIN AND SUBCUTANEOUS TISSUE: Status: ACTIVE | Noted: 2022-08-08

## 2022-08-08 PROCEDURE — ? COUNSELING

## 2022-08-08 PROCEDURE — ? BOTOX

## 2022-08-08 PROCEDURE — ? FILLERS

## 2022-08-08 PROCEDURE — ? SUNSCREEN RECOMMENDATIONS

## 2022-08-08 PROCEDURE — ? ADDITIONAL NOTES

## 2022-08-08 PROCEDURE — 99213 OFFICE O/P EST LOW 20 MIN: CPT

## 2022-08-08 NOTE — PROCEDURE: BOTOX
Additional Area 1 Location: bunny lines
Additional Area 5 Units: 0
Expiration Date (Month Year): 2024-03
Additional Area 5 Location: high forehead 1.5cm above brows
Show Anterior Platysmal Band Units: Yes
Show Right And Left Pupillary Line Units: No
Additional Area 1 Units: 4
Additional Area 6 Location: [facility].
Glabellar Complex Units: [address] [facility]
Additional Area 2 Location: chin
Price (Use Numbers Only, No Special Characters Or $): 0.00
Lot #: [id number]
Periorbital Skin Units: [address] [facility]
Forehead Units: 10
Consent: Written consent obtained. Risks include but not limited to lid/brow ptosis, bruising, swelling, diplopia, temporary effect, incomplete chemical denervation.
Additional Area 3 Location: high forehead 2 cm above brows
Detail Level: Zone
Dilution (U/0.1 Cc): 2.5
Show Inventory Tab: Show

## 2022-08-08 NOTE — PROCEDURE: ADDITIONAL NOTES
Detail Level: Detailed
Additional Notes: [id number] Exp. 07/2024
Render Risk Assessment In Note?: no

## 2022-08-08 NOTE — PROCEDURE: FILLERS
Mid Face Filler  Volume In Cc: 0
Consent: Written consent obtained. Risks include but not limited to bruising, beading, irregular texture, ulceration, infection, allergic reaction, scar formation, incomplete augmentation, temporary nature, procedural pain.
Anesthesia Volume In Cc: 0.5
Post-Care Instructions: Patient instructed to apply ice to reduce swelling.
Include Cannula Information In Note?: No
Additional Anesthesia Volume In Cc: 6
Number Of Syringes (Required For Inventory): 1
Inventory Information: This plan will send filler information to inventory based on the fillers you select. Multiple fillers can be sent but you must ensure you select the appropriate fillers in the inventory tab.
Detail Level: Detailed
Show Inventory Tab: Show
Filler: Restylane-L
Additional Area 1 Location: lateral jawline, lateral mouth, marionette lines.
Map Statment: See [address] for Complete Details
Additional Area 1 Location: lateral mouth, perioral fine lines, marionette lines.
Anesthesia Type: 1% lidocaine with epinephrine

## 2022-11-28 ENCOUNTER — APPOINTMENT (RX ONLY)
Dept: URBAN - METROPOLITAN AREA CLINIC 23 | Facility: CLINIC | Age: 52
Setting detail: DERMATOLOGY
End: 2022-11-28

## 2022-11-28 DIAGNOSIS — Z41.9 ENCOUNTER FOR PROCEDURE FOR PURPOSES OTHER THAN REMEDYING HEALTH STATE, UNSPECIFIED: ICD-10-CM

## 2022-11-28 PROCEDURE — ? BOTOX

## 2022-11-28 NOTE — PROCEDURE: BOTOX
Additional Area 5 Location: high forehead 1.5cm above brows
Show Additional Area 4: Yes
Lateral Platysmal Bands Units: 0
Additional Area 1 Location: lat brows
Glabellar Complex Units: [address] [facility]
Show Right And Left Pupillary Line Units: No
Forehead Units: 10
Expiration Date (Month Year): 2024-03
Additional Area 1 Units: 4
Additional Area 6 Location: [facility].
Price (Use Numbers Only, No Special Characters Or $): 0.00
Additional Area 2 Location: chin
Consent: Written consent obtained. Risks include but not limited to lid/brow ptosis, bruising, swelling, diplopia, temporary effect, incomplete chemical denervation.
Additional Area 3 Location: high forehead 2 cm above brows
Lot #: [id number]
Dilution (U/0.1 Cc): 2.5
Periorbital Skin Units: [address] [facility]
Detail Level: Zone
Show Inventory Tab: Show

## 2023-02-20 ENCOUNTER — APPOINTMENT (RX ONLY)
Dept: URBAN - METROPOLITAN AREA CLINIC 23 | Facility: CLINIC | Age: 53
Setting detail: DERMATOLOGY
End: 2023-02-20

## 2023-02-20 ENCOUNTER — RX ONLY (OUTPATIENT)
Age: 53
Setting detail: RX ONLY
End: 2023-02-20

## 2023-02-20 DIAGNOSIS — Z41.9 ENCOUNTER FOR PROCEDURE FOR PURPOSES OTHER THAN REMEDYING HEALTH STATE, UNSPECIFIED: ICD-10-CM

## 2023-02-20 PROCEDURE — ? BOTOX

## 2023-02-20 PROCEDURE — ? RECOMMENDATIONS

## 2023-02-20 RX ORDER — TRETIONIN 0.5 MG/G
CREAM TOPICAL
Qty: 20 | Refills: 3 | Status: ERX

## 2023-02-20 NOTE — PROCEDURE: BOTOX
Show Anterior Platysmal Band Units: Yes
Inferior Lateral Orbicularis Oculi Units: 0
Topical Anesthesia?: 20% benzocaine, 8% lidocaine, 4% tetracaine
Additional Area 5 Location: high forehead 1.5cm above brows
Expiration Date (Month Year): 2024-03
Show Right And Left Pupillary Line Units: No
Additional Area 2 Units: 24
Additional Area 2 Location: glabella
Length Of Topical Anesthesia Application (Optional): 15 minutes
Price (Use Numbers Only, No Special Characters Or $): 0.00
Additional Area 1 Location: high forehead
Consent: Written consent obtained. Risks include but not limited to lid/brow ptosis, bruising, swelling, diplopia, temporary effect, incomplete chemical denervation.
Additional Area 4 Location: bunny lines
Lot #: [id number]
Dilution (U/0.1 Cc): 2.5
Additional Area 1 Units: 12
Additional Area 4 Units: 6
Incrementing Botox Units: By 0.5 Units
Additional Area 6 Location: [facility].
Detail Level: Zone
Show Inventory Tab: Show
Additional Area 3 Location: lateral brows

## 2023-03-14 ENCOUNTER — APPOINTMENT (RX ONLY)
Dept: URBAN - METROPOLITAN AREA CLINIC 23 | Facility: CLINIC | Age: 53
Setting detail: DERMATOLOGY
End: 2023-03-14

## 2023-03-14 DIAGNOSIS — Z41.9 ENCOUNTER FOR PROCEDURE FOR PURPOSES OTHER THAN REMEDYING HEALTH STATE, UNSPECIFIED: ICD-10-CM

## 2023-03-14 PROCEDURE — ? PICOWAY LASER

## 2023-03-14 NOTE — PROCEDURE: PICOWAY LASER
Spot Size: 4.0 mm
Wavelength: 532 nm
Post-Procedure Care: Immediate endpoint: perifollicular erythema and edema. Vaseline and ice applied. Post care reviewed with patient.
Total Pulses: [address] [facility]
Spot Size: 6.5 mm
Hide Pulse Duration?: No
Total Pulses: 2 passes 725 pulse
Treatment Number: 1
Pulse Duration: 2.5 ms
Detail Level: Detailed
Fluence (J/Cm2): 0.5
Anesthesia Type: 1% lidocaine with epinephrine
Handpiece: Zoom
Spot Size: 2.0 mm
External Cooling Fan Speed: 0
Fluence (J/Cm2): 0.6
Frequency (Hz): Linda Corporal
Fluence (J/Cm2): 2
Consent: Written consent obtained, risks reviewed including but not limited to crusting, scabbing, blistering, scarring, darker or lighter pigmentary change, paradoxical hair regrowth, incomplete removal of hair and infection.
Price (Use Numbers Only, No Special Characters Or $): 350.00
Post-Care Instructions: I reviewed with the patient in detail post-care instructions. Patient should avoid sun for a minimum of 4 weeks before and after treatment.
Pre-Procedure: Prior to proceeding the treatment areas were cleaned and all present put on their eye protection.

## 2023-05-08 ENCOUNTER — APPOINTMENT (RX ONLY)
Dept: URBAN - METROPOLITAN AREA CLINIC 23 | Facility: CLINIC | Age: 53
Setting detail: DERMATOLOGY
End: 2023-05-08

## 2023-05-08 DIAGNOSIS — Z41.9 ENCOUNTER FOR PROCEDURE FOR PURPOSES OTHER THAN REMEDYING HEALTH STATE, UNSPECIFIED: ICD-10-CM

## 2023-05-08 PROCEDURE — ? BOTOX

## 2023-05-08 NOTE — PROCEDURE: BOTOX
Additional Area 5 Location: high forehead 1.5cm above brows
Expiration Date (Month Year): 2024-03
Show Anterior Platysmal Band Units: Yes
Glabellar Complex Units: 0
Show Right And Left Pupillary Line Units: No
Additional Area 2 Location: chin
Periorbital Skin Units: [address]
Incrementing Botox Units: By 0.5 Units
Price (Use Numbers Only, No Special Characters Or $): 0.00
Consent: Written consent obtained. Risks include but not limited to lid/brow ptosis, bruising, swelling, diplopia, temporary effect, incomplete chemical denervation.
Detail Level: Zone
Lot #: [id number]
Dilution (U/0.1 Cc): 2.5
Additional Area 1 Units: 4
Forehead Units: 8
Additional Area 1 Location: bunny lines
Glabellar Complex Units: 21
Show Inventory Tab: Show
Additional Area 3 Location: high forehead 2 cm above brows
Additional Area 6 Location: [facility].

## 2023-08-17 ENCOUNTER — APPOINTMENT (RX ONLY)
Dept: URBAN - METROPOLITAN AREA CLINIC 98 | Facility: CLINIC | Age: 53
Setting detail: DERMATOLOGY
End: 2023-08-17

## 2023-08-17 DIAGNOSIS — Z41.9 ENCOUNTER FOR PROCEDURE FOR PURPOSES OTHER THAN REMEDYING HEALTH STATE, UNSPECIFIED: ICD-10-CM

## 2023-08-17 PROCEDURE — ? RECOMMENDATIONS

## 2023-08-17 PROCEDURE — ? FILLERS

## 2023-08-17 PROCEDURE — ? ADDITIONAL NOTES

## 2023-08-17 PROCEDURE — ? BOTOX

## 2023-08-17 NOTE — PROCEDURE: RECOMMENDATIONS
Detail Level: Detailed
Render Risk Assessment In Note?: no
Recommendations (Free Text): Sculptra temples and lower face; vials quoted $900.00 per vial

## 2023-08-17 NOTE — PROCEDURE: ADDITIONAL NOTES
Render Risk Assessment In Note?: no
Detail Level: Simple
Additional Notes: Physician sample [facility] Contour \\n1CC lateral cheeks\\nLOT [id number]\\WVVP 2024-02-28

## 2023-08-17 NOTE — PROCEDURE: BOTOX
Lot #: [id number]
Lcl Root Units: 0
Show Glabellar Units: Yes
Expiration Date (Month Year): 2024/05
Detail Level: Detailed
Periorbital Skin Units: [address] [facility]
Incrementing Botox Units: By 0.5 Units
Show Ucl Units: No
Additional Area 2 Location: lateral brows
Additional Area 6 Location: neckbands
Dilution (U/0.1 Cc): 2.5
Additional Area 1 Units: 4
Consent: Written consent obtained. Risks include but not limited to lid/brow ptosis, bruising, swelling, diplopia, temporary effect, incomplete chemical denervation.
Show Inventory Tab: Show
Post-Care Instructions: Patient instructed to not lie down for 4 hours and limit physical activity for 24 hours. Patient instructed not to travel by airplane for 48 hours.
Additional Area 3 Location: lip flap

## 2023-08-17 NOTE — PROCEDURE: FILLERS
Tear Troughs Filler  Volume In Cc: 0
Anesthesia Volume In Cc: 0.5
Cheeks Filler Volume In Cc: 1
Show Inventory Tab: Show
Include Cannula Information In Note?: No
Additional Area 4 Location: tao oral
Consent: Written consent obtained. Risks include but not limited to bruising, beading, irregular texture, ulceration, infection, allergic reaction, scar formation, incomplete augmentation, temporary nature, procedural pain.
Additional Area 3 Location: right hand
Post-Care Instructions: Patient instructed to apply ice to reduce swelling.
Additional Area 2 Location: lateral jawline
Additional Area 1 Location: lateral jaw
Additional Area 1 Location: cheeks, NLFs.
Include Cannula Brand?: DermaSculpt
Additional Area 4 Location: upper lip , lower lip, lip stick lines,cheeks
Filler: Swapna Fenton
Additional Area 2 Location: tao oral fine lines
Include Cannula Length?: 1.5 inch
Include Cannula Size?: 25G
Additional Area 1 Location: chin, left earlobe
Inventory Information: This plan will send filler information to inventory based on the fillers you select. Multiple fillers can be sent but you must ensure you select the appropriate fillers in the inventory tab.
Additional Area 2 Location: tao oral fine lines, marionette lines, nasal folds, lips
Detail Level: Detailed
Additional Area 5 Location: upper lips and lower,fine lines
Map Statment: See [address] for Complete Details
Additional Area 1 Location: [place]

## 2024-01-09 ENCOUNTER — APPOINTMENT (RX ONLY)
Dept: URBAN - METROPOLITAN AREA CLINIC 23 | Facility: CLINIC | Age: 54
Setting detail: DERMATOLOGY
End: 2024-01-09

## 2024-01-09 DIAGNOSIS — Z41.9 ENCOUNTER FOR PROCEDURE FOR PURPOSES OTHER THAN REMEDYING HEALTH STATE, UNSPECIFIED: ICD-10-CM

## 2024-01-09 PROCEDURE — ? BOTOX

## 2024-01-09 NOTE — PROCEDURE: BOTOX
Additional Area 5 Location: high forehead 1.5cm above brows
Expiration Date (Month Year): 2024-03
Show Anterior Platysmal Band Units: Yes
Glabellar Complex Units: 0
Show Right And Left Pupillary Line Units: No
Additional Area 2 Location: chin
Periorbital Skin Units: 16
Incrementing Botox Units: By 0.5 Units
Price (Use Numbers Only, No Special Characters Or $): 0.00
Consent: Written consent obtained. Risks include but not limited to lid/brow ptosis, bruising, swelling, diplopia, temporary effect, incomplete chemical denervation.
Detail Level: Zone
Lot #: [id number]
Dilution (U/0.1 Cc): 2.5
Additional Area 1 Units: 4
Forehead Units: 8
Additional Area 1 Location: bunny lines
Glabellar Complex Units: 20
Show Inventory Tab: Show
Additional Area 3 Location: high forehead 2 cm above brows
Additional Area 6 Location: [facility].

## 2024-02-05 ENCOUNTER — APPOINTMENT (RX ONLY)
Dept: URBAN - METROPOLITAN AREA CLINIC 23 | Facility: CLINIC | Age: 54
Setting detail: DERMATOLOGY
End: 2024-02-05

## 2024-02-05 DIAGNOSIS — Z41.9 ENCOUNTER FOR PROCEDURE FOR PURPOSES OTHER THAN REMEDYING HEALTH STATE, UNSPECIFIED: ICD-10-CM

## 2024-02-05 PROCEDURE — ? IN-HOUSE DISPENSING PHARMACY

## 2024-02-05 PROCEDURE — ? ADDITIONAL NOTES

## 2024-02-05 NOTE — PROCEDURE: ADDITIONAL NOTES
Additional Notes: Written consent obtained, risks reviewed including but not limited to crusting, scabbing, blistering, scarring, darker\\nor lighter pigmentary change, incomplete improvement of dyschromia, wrinkles, prolonged erythema and facial\\nswelling, infection and bleeding.\\n\\nPhotos taken: Yes/No\\nAnesthesia: topical Lidocaine/Prilocaine & local infiltration\\nAmount of anesthesia: \\nType of eye protection: \\n\\nCO2RE Laser with the following settings:\\n\\nSite: \\nMode: \\nFractionated coverage:\\nRing:\\nRing Size:\\nRing mj:\\nRing Fluence:\\nCore mg: \\nCore Fluence:\\n\\nSite:\\nMode: \\nFractionated coverage:\\nRing:\\nRing Size:\\nRing mj:\\nRing Fluence:\\nCore mg: \\nCore Fluence:\\n\\nSite:\\nMode: \\nFractionated coverage:\\nRing:\\nRing Size:\\nRing mj:\\nRing Fluence:\\nCore mg: \\nCore Fluence:\\n\\n\\n\\nI reviewed with the patient in detail post-care instructions. Patient should avoid sun until area fully healed. Pt should apply vaseline to treated areas, and remove crusts gently with water-vinegar soaks.

## 2024-02-05 NOTE — PROCEDURE: IN-HOUSE DISPENSING PHARMACY
Product 1 Application Directions: Apply thin layer to right cheek every night  at bedtime only.
Product 36 Price/Unit (In Dollars): 0
Product 12 Application Directions: Apply a pea size amount to the entire face at bedtime.
Product 44 Unit Type: mg
Product 7 Unit Type: ml
Name Of Product 8: TNS Essential
Product 21 Unit Type: tablets
Name Of Product 22: Brightening Pads
Product 12 Unit Type: bottle(s)
Product 10 Refills: 1
Product 1 Unit Type: tube(s)
Product 6 Application Directions: Take one tablet for swelling today at the office as indicated and one tablet tomorrow
Name Of Product 13: Skin Better Even tone correcting serum
Name Of Product 2: Valtrex 1 gram
Product 2 Units Dispensed: 3
Product 8 Application Directions: Apply to the clean face in the AM and PM daily
Product 22 Application Directions: Apply to the affected area of the face
Product 2 Application Directions: apply to affected areas left lower leg am and pm  as indicated
Product 13 Application Directions: Apply to the entire face in the Am and pm
Product 2 Amount/Unit (Numbers Only): 6
Send Charges To Patient Encounter: No
Name Of Product 5: loratadine 10 mg
Name Of Product 7: Latisse 5ml
Product 11 Application Directions: Apply a thin layer to the acne prone areas in the AM
Name Of Product 21: Valium 5mg
Product 5 Application Directions: Take one tablet today after procedure with full glass of water as indicated
Name Of Product 12: Retinol Lite
Name Of Product 1: Hydroquinone 4% / Tretinoin 0.05% / Fluocinolone 0.01%
Product 7 Application Directions: Apply to lashes at bedtime daily as indicated
Name Of Product 6: Prednisone 20mg
Product 3 Amount/Unit (Numbers Only): 20
Product 21 Application Directions: Take one tablet 30 minutes prior to procedure
Product 3 Unit Type: grams
Name Of Product 4: Valium 5 mg
Product 10 Application Directions: Take 1 tablet 2 times daily for 3 days
Product 2 Price/Unit (In Dollars): 0.00
Product 4 Application Directions: Take one tablet half hour today prior to procedure as indicated. Dispense in office
Name Of Product 11: Clearing gel
Detail Level: Zone
Name Of Product 9: Clearing tone pads
Name Of Product 14: Skin Better Interfuse eye treatment cream
Name Of Product 3: Tretinoin 0.05% cream
Product 9 Application Directions: Apply to the acne prone areas in the Am and Pm
Name Of Product 19: Diazepam 5mg
Product 9 Unit Type: jar(s)
Product 14 Application Directions: Apply to the under eyes in the Am and pm
Product 3 Application Directions: Apply a pea size amount to the entire face at night
Name Of Product 10: Valtrex 500 mg
Product 7 Amount/Unit (Numbers Only): 5

## 2024-02-07 ENCOUNTER — RX ONLY (OUTPATIENT)
Age: 54
Setting detail: RX ONLY
End: 2024-02-07

## 2024-02-07 RX ORDER — PREDNISONE 10 MG/1
TABLET ORAL
Qty: 10 | Refills: 0 | Status: ERX | COMMUNITY
Start: 2024-02-07

## 2024-02-08 ENCOUNTER — APPOINTMENT (RX ONLY)
Dept: URBAN - METROPOLITAN AREA CLINIC 23 | Facility: CLINIC | Age: 54
Setting detail: DERMATOLOGY
End: 2024-02-08

## 2024-02-08 DIAGNOSIS — Z41.9 ENCOUNTER FOR PROCEDURE FOR PURPOSES OTHER THAN REMEDYING HEALTH STATE, UNSPECIFIED: ICD-10-CM

## 2024-02-08 PROCEDURE — ? DIAGNOSIS COMMENT

## 2024-02-08 PROCEDURE — ? TREATMENT REGIMEN

## 2024-02-12 ENCOUNTER — APPOINTMENT (RX ONLY)
Dept: URBAN - METROPOLITAN AREA CLINIC 23 | Facility: CLINIC | Age: 54
Setting detail: DERMATOLOGY
End: 2024-02-12

## 2024-02-12 DIAGNOSIS — Z41.9 ENCOUNTER FOR PROCEDURE FOR PURPOSES OTHER THAN REMEDYING HEALTH STATE, UNSPECIFIED: ICD-10-CM

## 2024-02-12 PROCEDURE — ? TREATMENT REGIMEN

## 2024-02-12 PROCEDURE — ? DIAGNOSIS COMMENT

## 2024-02-19 ENCOUNTER — APPOINTMENT (RX ONLY)
Dept: URBAN - METROPOLITAN AREA CLINIC 23 | Facility: CLINIC | Age: 54
Setting detail: DERMATOLOGY
End: 2024-02-19

## 2024-02-19 DIAGNOSIS — Z41.9 ENCOUNTER FOR PROCEDURE FOR PURPOSES OTHER THAN REMEDYING HEALTH STATE, UNSPECIFIED: ICD-10-CM

## 2024-02-19 PROCEDURE — ? TREATMENT REGIMEN

## 2024-02-19 PROCEDURE — ? DIAGNOSIS COMMENT

## 2024-02-19 PROCEDURE — ? BOTOX

## 2024-02-19 PROCEDURE — ? ADDITIONAL NOTES

## 2024-02-19 ASSESSMENT — LOCATION DETAILED DESCRIPTION DERM: LOCATION DETAILED: RIGHT MEDIAL FRONTAL SCALP

## 2024-02-19 ASSESSMENT — LOCATION SIMPLE DESCRIPTION DERM: LOCATION SIMPLE: RIGHT SCALP

## 2024-02-19 ASSESSMENT — LOCATION ZONE DERM: LOCATION ZONE: SCALP

## 2024-02-19 NOTE — PROCEDURE: ADDITIONAL NOTES
Render Risk Assessment In Note?: no
Detail Level: Simple
Additional Notes: Post day 15. Patient doing well. Mild redness which may last to 6 weeks. Advise patient continue applying chemical free sunscreen.

## 2024-02-19 NOTE — PROCEDURE: BOTOX
Additional Area 4 Units: 0
Show Right And Left Brow Units: No
Additional Area 3 Location: lateral brows
Post-Care Instructions: Patient instructed to not lie down for 4 hours and limit physical activity for 24 hours. Patient instructed not to travel by airplane for 48 hours.
Show [place] Units: Yes
Lot #: [id number]
Expiration Date (Month Year): 2024/05
Detail Level: Detailed
Additional Area 2 Location: [place]
Additional Area 6 Location: [facility]
Periorbital Skin Units: 6
Dilution (U/0.1 Cc): 2.5
Incrementing Botox Units: By 0.5 Units
Additional Area 5 Location: axillas
Additional Area 1 Location: right eyebrow
Show Inventory Tab: Show
Consent: Written consent obtained. Risks include but not limited to lid/brow ptosis, bruising, swelling, diplopia, temporary effect, incomplete chemical denervation.

## 2024-04-08 ENCOUNTER — APPOINTMENT (RX ONLY)
Dept: URBAN - METROPOLITAN AREA CLINIC 23 | Facility: CLINIC | Age: 54
Setting detail: DERMATOLOGY
End: 2024-04-08

## 2024-04-08 DIAGNOSIS — Z41.9 ENCOUNTER FOR PROCEDURE FOR PURPOSES OTHER THAN REMEDYING HEALTH STATE, UNSPECIFIED: ICD-10-CM

## 2024-04-08 PROCEDURE — ? BOTOX

## 2024-04-08 NOTE — PROCEDURE: BOTOX
Additional Area 5 Location: high forehead 1.5cm above brows
Expiration Date (Month Year): 2024-03
Show Anterior Platysmal Band Units: Yes
Glabellar Complex Units: 0
Show Right And Left Pupillary Line Units: No
Additional Area 2 Location: chin
Periorbital Skin Units: 16
Incrementing Botox Units: By 0.5 Units
Price (Use Numbers Only, No Special Characters Or $): 0.00
Consent: Written consent obtained. Risks include but not limited to lid/brow ptosis, bruising, swelling, diplopia, temporary effect, incomplete chemical denervation.
Detail Level: Zone
Lot #: [id number]
Dilution (U/0.1 Cc): 2.5
Additional Area 1 Units: 4
Forehead Units: 8
Additional Area 1 Location: bunny lines
Glabellar Complex Units: 20
Show Inventory Tab: Show
Additional Area 3 Location: high forehead 2 cm above brows
Additional Area 6 Location: [facility].

## 2024-08-28 ENCOUNTER — APPOINTMENT (RX ONLY)
Dept: URBAN - METROPOLITAN AREA CLINIC 23 | Facility: CLINIC | Age: 54
Setting detail: DERMATOLOGY
End: 2024-08-28

## 2024-08-28 DIAGNOSIS — L663 OTHER SPECIFIED DISEASES OF HAIR AND HAIR FOLLICLES: ICD-10-CM

## 2024-08-28 DIAGNOSIS — L72.11 PILAR CYST: ICD-10-CM

## 2024-08-28 DIAGNOSIS — L738 OTHER SPECIFIED DISEASES OF HAIR AND HAIR FOLLICLES: ICD-10-CM

## 2024-08-28 DIAGNOSIS — L73.9 FOLLICULAR DISORDER, UNSPECIFIED: ICD-10-CM

## 2024-08-28 DIAGNOSIS — Z71.89 OTHER SPECIFIED COUNSELING: ICD-10-CM

## 2024-08-28 DIAGNOSIS — D49.2 NEOPLASM OF UNSPECIFIED BEHAVIOR OF BONE, SOFT TISSUE, AND SKIN: ICD-10-CM

## 2024-08-28 DIAGNOSIS — D22 MELANOCYTIC NEVI: ICD-10-CM

## 2024-08-28 DIAGNOSIS — D18.0 HEMANGIOMA: ICD-10-CM

## 2024-08-28 PROBLEM — D22.5 MELANOCYTIC NEVI OF TRUNK: Status: ACTIVE | Noted: 2024-08-28

## 2024-08-28 PROBLEM — D18.01 HEMANGIOMA OF SKIN AND SUBCUTANEOUS TISSUE: Status: ACTIVE | Noted: 2024-08-28

## 2024-08-28 PROBLEM — L02.32 FURUNCLE OF BUTTOCK: Status: ACTIVE | Noted: 2024-08-28

## 2024-08-28 PROCEDURE — ? COUNSELING

## 2024-08-28 PROCEDURE — ? RECOMMENDATIONS

## 2024-08-28 PROCEDURE — ? BIOPSY BY SHAVE METHOD

## 2024-08-28 PROCEDURE — ? PRESCRIPTION

## 2024-08-28 PROCEDURE — 11102 TANGNTL BX SKIN SINGLE LES: CPT

## 2024-08-28 PROCEDURE — 99213 OFFICE O/P EST LOW 20 MIN: CPT | Mod: 25

## 2024-08-28 PROCEDURE — ? SUNSCREEN RECOMMENDATIONS

## 2024-08-28 RX ORDER — CLINDAMYCIN PHOSPHATE 10 MG/G
GEL TOPICAL QD
Qty: 60 | Refills: 3 | Status: ERX | COMMUNITY
Start: 2024-08-28

## 2024-08-28 RX ADMIN — CLINDAMYCIN PHOSPHATE: 10 GEL TOPICAL at 00:00

## 2024-08-28 ASSESSMENT — LOCATION SIMPLE DESCRIPTION DERM
LOCATION SIMPLE: RIGHT BUTTOCK
LOCATION SIMPLE: UPPER BACK
LOCATION SIMPLE: RIGHT PRETIBIAL REGION
LOCATION SIMPLE: POSTERIOR SCALP
LOCATION SIMPLE: CHEST

## 2024-08-28 ASSESSMENT — LOCATION DETAILED DESCRIPTION DERM
LOCATION DETAILED: RIGHT LATERAL SUPERIOR CHEST
LOCATION DETAILED: RIGHT BUTTOCK
LOCATION DETAILED: SUPERIOR THORACIC SPINE
LOCATION DETAILED: LEFT OCCIPITAL SCALP
LOCATION DETAILED: RIGHT DISTAL PRETIBIAL REGION

## 2024-08-28 ASSESSMENT — LOCATION ZONE DERM
LOCATION ZONE: TRUNK
LOCATION ZONE: SCALP
LOCATION ZONE: LEG

## 2024-08-28 NOTE — PROCEDURE: RECOMMENDATIONS
Detail Level: Detailed
Recommendations (Free Text): Cold sculpting with [place] Abdomen area and love handles
Render Risk Assessment In Note?: no

## 2024-08-28 NOTE — PROCEDURE: BIOPSY BY SHAVE METHOD
Detail Level: Detailed
Depth Of Biopsy: dermis
Was A Bandage Applied: Yes
Size Of Lesion In Cm: 0
Biopsy Type: H and E
Biopsy Method: 15 blade
Anesthesia Type: 1% lidocaine without epinephrine
Anesthesia Volume In Cc: 0.4
Hemostasis: Aluminum Chloride
Wound Care: Vaseline
Dressing: Band-Aid
Destruction After The Procedure: No
Type Of Destruction Used: Curettage
Curettage Text: The wound bed was treated with curettage after the biopsy was performed.
Cryotherapy Text: The wound bed was treated with cryotherapy after the biopsy was performed.
Electrodesiccation Text: The wound bed was treated with electrodesiccation after the biopsy was performed.
Electrodesiccation And Curettage Text: The wound bed was treated with electrodesiccation and curettage after the biopsy was performed.
Silver Nitrate Text: The wound bed was treated with silver nitrate after the biopsy was performed.
Lab: [id number]
Consent: The provider's intent is to obtain a tissue sample solely for diagnostic purposes.  Written consent to obtain tissue sample was obtained and risks were reviewed including but not limited to scarring, infection, bleeding, scabbing, incomplete removal, nerve damage and allergy to anesthesia.
Post-Care Instructions: I reviewed with the patient in detail post-care instructions. Patient is to keep the biopsy site dry overnight, and then apply bacitracin twice daily until healed. Patient may apply hydrogen peroxide soaks to remove any crusting.
Notification Instructions: Patient will be notified of biopsy results. However, patient instructed to call the office if not contacted within 2 weeks.
Billing Type: Third-Party Bill
Information: Selecting Yes will display possible errors in your note based on the variables you have selected. This validation is only offered as a suggestion for you. PLEASE NOTE THAT THE VALIDATION TEXT WILL BE REMOVED WHEN YOU FINALIZE YOUR NOTE. IF YOU WANT TO FAX A PRELIMINARY NOTE YOU WILL NEED TO TOGGLE THIS TO 'NO' IF YOU DO NOT WANT IT IN YOUR FAXED NOTE.

## 2024-09-18 ENCOUNTER — APPOINTMENT (RX ONLY)
Dept: URBAN - METROPOLITAN AREA CLINIC 23 | Facility: CLINIC | Age: 54
Setting detail: DERMATOLOGY
End: 2024-09-18

## 2024-09-18 DIAGNOSIS — Z41.9 ENCOUNTER FOR PROCEDURE FOR PURPOSES OTHER THAN REMEDYING HEALTH STATE, UNSPECIFIED: ICD-10-CM

## 2024-09-18 DIAGNOSIS — L57.0 ACTINIC KERATOSIS: ICD-10-CM

## 2024-09-18 PROCEDURE — ? LIQUID NITROGEN

## 2024-09-18 PROCEDURE — 17000 DESTRUCT PREMALG LESION: CPT

## 2024-09-18 PROCEDURE — ? COUNSELING

## 2024-09-18 PROCEDURE — ? DIAGNOSIS COMMENT

## 2024-09-18 PROCEDURE — ? COSMETIC CONSULTATION: GENERAL

## 2024-09-18 PROCEDURE — 17003 DESTRUCT PREMALG LES 2-14: CPT

## 2024-09-18 ASSESSMENT — LOCATION DETAILED DESCRIPTION DERM
LOCATION DETAILED: RIGHT LATERAL BREAST 11-12:00 REGION
LOCATION DETAILED: RIGHT LATERAL SUPERIOR CHEST

## 2024-09-18 ASSESSMENT — LOCATION SIMPLE DESCRIPTION DERM
LOCATION SIMPLE: RIGHT BREAST
LOCATION SIMPLE: CHEST

## 2024-09-18 ASSESSMENT — LOCATION ZONE DERM: LOCATION ZONE: TRUNK

## 2024-09-18 NOTE — PROCEDURE: DIAGNOSIS COMMENT
Render Risk Assessment In Note?: no
Comment: Hyperplastic.\\nBiopsy proven 08/28/2024
Detail Level: Simple

## 2024-09-18 NOTE — PROCEDURE: LIQUID NITROGEN
Number Of Freeze-Thaw Cycles: 1 freeze-thaw cycle
Render Post-Care Instructions In Note?: no
Detail Level: Detailed
Show Aperture Variable?: Yes
Consent: The patient's consent was obtained including but not limited to risks of crusting, scabbing, blistering, scarring, darker or lighter pigmentary change, recurrence, incomplete removal and infection.
Duration Of Freeze Thaw-Cycle (Seconds): 10
Post-Care Instructions: I reviewed with the patient in detail post-care instructions. Patient is to wear sunprotection, and avoid picking at any of the treated lesions. Pt may apply Vaseline to crusted or scabbing areas.

## 2024-11-19 ENCOUNTER — APPOINTMENT (RX ONLY)
Dept: URBAN - METROPOLITAN AREA CLINIC 23 | Facility: CLINIC | Age: 54
Setting detail: DERMATOLOGY
End: 2024-11-19

## 2024-11-19 DIAGNOSIS — Z41.9 ENCOUNTER FOR PROCEDURE FOR PURPOSES OTHER THAN REMEDYING HEALTH STATE, UNSPECIFIED: ICD-10-CM

## 2024-11-19 PROCEDURE — ? BOTOX

## 2024-11-19 NOTE — PROCEDURE: BOTOX
Anterior Platysmal Bands Units: 0
Show Lateral Platysmal Band Units: Yes
Additional Area 5 Location: high forehead 1.5cm above brows
Expiration Date (Month Year): 2024-03
Show Right And Left Pupillary Line Units: No
Additional Area 2 Location: chin
Glabellar Complex Units: 20
Price (Use Numbers Only, No Special Characters Or $): 0.00
Show Inventory Tab: Show
Forehead Units: 8
Lot #: [id number]
Additional Area 1 Location: bunny lines
Detail Level: Zone
Consent: Written consent obtained. Risks include but not limited to lid/brow ptosis, bruising, swelling, diplopia, temporary effect, incomplete chemical denervation.
Additional Area 1 Units: 4
Incrementing Botox Units: By 0.5 Units
Dilution (U/0.1 Cc): 2.5
Additional Area 6 Location: [facility].
Additional Area 3 Location: high forehead 2 cm above brows
Periorbital Skin Units: 16

## 2024-12-23 ENCOUNTER — RX ONLY (RX ONLY)
Age: 54
End: 2024-12-23

## 2024-12-23 RX ORDER — TRETIONIN 0.5 MG/G
CREAM TOPICAL
Qty: 20 | Refills: 3 | Status: ERX

## 2025-01-22 NOTE — PROCEDURE: DYSPORT
Left message and sent livewell message with dosing instructions  Last INR on 1/20/2025 was 1.8.  Dose increased.   Today's INR is 2.1 and is within goal range.      Informed the INR result is within therapeutic range and instructed to maintain current dose. Discussed dose and return date of 1/24/25 for next INR. See Anticoagulation flowsheet.    Maryann is in the office today supervising the treatment. Note forwarded for review.    Instructed to contact the clinic with any unusual bleeding or bruising, any changes in medications, diet, health status, lifestyle, or any other changes, questions or concerns. Verbalized understanding of all discussed.     
Lcl Root Units: 0
Show Depressor Anguli Units: Yes
Additional Area 3 Units: [id number] [address]
Show Right And Left Brow Units: No
Additional Area 1 Units: [id number] [facility]
Dilution (U/ 0.1cc): 1.5
Additional Area 2 Location: bunny lines
Topical Anesthesia?: 20% benzocaine, 8% lidocaine, 4% tetracaine
Additional Area 4 Location: lateral eyes
Consent: Written consent obtained. Risks include but not limited to lid/brow ptosis, bruising, swelling, diplopia, temporary effect, incomplete chemical denervation.
Additional Area 4 Units: [id number] [facility]
Expiration Date (Month Year): 05/31/22
Additional Area 2 Units: 10
Length Of Topical Anesthesia Application (Optional): 15 minutes
Lot #: [id number]
Additional Area 5 Location: glabella
Detail Level: Simple
Price (Use Numbers Only, No Special Characters Or $): 0.00
Additional Area 1 Location: high forehead 2 cm above brows

## 2025-03-24 ENCOUNTER — APPOINTMENT (OUTPATIENT)
Dept: URBAN - METROPOLITAN AREA CLINIC 23 | Facility: CLINIC | Age: 55
Setting detail: DERMATOLOGY
End: 2025-03-24

## 2025-03-24 DIAGNOSIS — Z41.9 ENCOUNTER FOR PROCEDURE FOR PURPOSES OTHER THAN REMEDYING HEALTH STATE, UNSPECIFIED: ICD-10-CM

## 2025-03-24 PROCEDURE — ? BOTOX

## 2025-03-24 NOTE — PROCEDURE: BOTOX
Anterior Platysmal Bands Units: 0
Show Lateral Platysmal Band Units: Yes
Additional Area 5 Location: high forehead 1.5cm above brows
Expiration Date (Month Year): 2024-03
Show Right And Left Pupillary Line Units: No
Additional Area 2 Location: chin
Glabellar Complex Units: 20
Price (Use Numbers Only, No Special Characters Or $): 0.00
Show Inventory Tab: Show
Forehead Units: 8
Lot #: [id number]
Additional Area 1 Location: bunny lines
Detail Level: Zone
Consent: Written consent obtained. Risks include but not limited to lid/brow ptosis, bruising, swelling, diplopia, temporary effect, incomplete chemical denervation.
Additional Area 1 Units: 4
Incrementing Botox Units: By 0.5 Units
Dilution (U/0.1 Cc): 2.5
Additional Area 6 Location: [facility].
Additional Area 3 Location: high forehead 2 cm above brows
Periorbital Skin Units: 16

## 2025-07-15 ENCOUNTER — APPOINTMENT (OUTPATIENT)
Dept: URBAN - METROPOLITAN AREA CLINIC 23 | Facility: CLINIC | Age: 55
Setting detail: DERMATOLOGY
End: 2025-07-15

## 2025-07-15 DIAGNOSIS — Z41.9 ENCOUNTER FOR PROCEDURE FOR PURPOSES OTHER THAN REMEDYING HEALTH STATE, UNSPECIFIED: ICD-10-CM

## 2025-07-15 PROCEDURE — ? BOTOX

## 2025-07-15 NOTE — PROCEDURE: BOTOX
Anterior Platysmal Bands Units: 0
Show Lateral Platysmal Band Units: Yes
Additional Area 5 Location: high forehead 1.5cm above brows
Expiration Date (Month Year): 2024-03
Show Right And Left Pupillary Line Units: No
Additional Area 2 Location: chin
Glabellar Complex Units: 20
Price (Use Numbers Only, No Special Characters Or $): 0.00
Show Inventory Tab: Show
Forehead Units: 8
Lot #: [id number]
Additional Area 1 Location: bunny lines
Detail Level: Zone
Consent: Written consent obtained. Risks include but not limited to lid/brow ptosis, bruising, swelling, diplopia, temporary effect, incomplete chemical denervation.
Additional Area 1 Units: 4
Incrementing Botox Units: By 0.5 Units
Dilution (U/0.1 Cc): 2.5
Additional Area 6 Location: [facility].
Additional Area 3 Location: high forehead 2 cm above brows
Periorbital Skin Units: 16